# Patient Record
Sex: FEMALE | Race: WHITE | NOT HISPANIC OR LATINO | Employment: STUDENT | URBAN - METROPOLITAN AREA
[De-identification: names, ages, dates, MRNs, and addresses within clinical notes are randomized per-mention and may not be internally consistent; named-entity substitution may affect disease eponyms.]

---

## 2017-01-17 ENCOUNTER — COMMUNICATION - HEALTHEAST (OUTPATIENT)
Dept: FAMILY MEDICINE | Facility: CLINIC | Age: 32
End: 2017-01-17

## 2017-01-18 ENCOUNTER — COMMUNICATION - HEALTHEAST (OUTPATIENT)
Dept: FAMILY MEDICINE | Facility: CLINIC | Age: 32
End: 2017-01-18

## 2017-03-07 ENCOUNTER — COMMUNICATION - HEALTHEAST (OUTPATIENT)
Dept: HEALTH INFORMATION MANAGEMENT | Facility: CLINIC | Age: 32
End: 2017-03-07

## 2017-03-16 ENCOUNTER — COMMUNICATION - HEALTHEAST (OUTPATIENT)
Dept: FAMILY MEDICINE | Facility: CLINIC | Age: 32
End: 2017-03-16

## 2017-05-16 ENCOUNTER — COMMUNICATION - HEALTHEAST (OUTPATIENT)
Dept: FAMILY MEDICINE | Facility: CLINIC | Age: 32
End: 2017-05-16

## 2017-09-20 ENCOUNTER — AMBULATORY - HEALTHEAST (OUTPATIENT)
Dept: FAMILY MEDICINE | Facility: CLINIC | Age: 32
End: 2017-09-20

## 2018-06-21 ENCOUNTER — COMMUNICATION - HEALTHEAST (OUTPATIENT)
Dept: FAMILY MEDICINE | Facility: CLINIC | Age: 33
End: 2018-06-21

## 2019-07-24 ENCOUNTER — COMMUNICATION - HEALTHEAST (OUTPATIENT)
Dept: FAMILY MEDICINE | Facility: CLINIC | Age: 34
End: 2019-07-24

## 2019-07-24 DIAGNOSIS — B00.1 COLD SORE: ICD-10-CM

## 2019-08-19 ENCOUNTER — OFFICE VISIT - HEALTHEAST (OUTPATIENT)
Dept: FAMILY MEDICINE | Facility: CLINIC | Age: 34
End: 2019-08-19

## 2019-08-19 DIAGNOSIS — Z00.00 ROUTINE GENERAL MEDICAL EXAMINATION AT A HEALTH CARE FACILITY: ICD-10-CM

## 2019-08-19 LAB
CHOLEST SERPL-MCNC: 171 MG/DL
FASTING STATUS PATIENT QL REPORTED: YES
FASTING STATUS PATIENT QL REPORTED: YES
GLUCOSE BLD-MCNC: 85 MG/DL (ref 70–99)
HDLC SERPL-MCNC: 61 MG/DL
HGB BLD-MCNC: 13.8 G/DL (ref 12–16)
LDLC SERPL CALC-MCNC: 83 MG/DL
TRIGL SERPL-MCNC: 133 MG/DL
TSH SERPL DL<=0.005 MIU/L-ACNC: 1.08 UIU/ML (ref 0.3–5)

## 2019-08-19 ASSESSMENT — MIFFLIN-ST. JEOR: SCORE: 1538.74

## 2019-08-22 LAB
BKR LAB AP ABNORMAL BLEEDING: NO
BKR LAB AP BIRTH CONTROL/HORMONES: NORMAL
BKR LAB AP CERVICAL APPEARANCE: NORMAL
BKR LAB AP GYN ADEQUACY: NORMAL
BKR LAB AP GYN INTERPRETATION: NORMAL
BKR LAB AP HPV REFLEX: NORMAL
BKR LAB AP LMP: NORMAL
BKR LAB AP PATIENT STATUS: NORMAL
BKR LAB AP PREVIOUS ABNORMAL: NORMAL
BKR LAB AP PREVIOUS NORMAL: 2015
HIGH RISK?: NO
PATH REPORT.COMMENTS IMP SPEC: NORMAL
RESULT FLAG (HE HISTORICAL CONVERSION): NORMAL

## 2021-05-30 NOTE — TELEPHONE ENCOUNTER
Please help this patient make an appointment (has not been seen since 2014) and route back to me for limited refill    Thanks!    BB

## 2021-05-30 NOTE — TELEPHONE ENCOUNTER
RN cannot approve Refill Request    RN can NOT refill this medication overdue for office visits and/or labs.    Enoch Adrian, [facility] Triage/Med Refill 7/25/2019    Requested Prescriptions   Pending Prescriptions Disp Refills     valACYclovir (VALTREX) 1000 MG tablet [Pharmacy Med Name: VALACYCLOVIR HCL 1 GRAM TABLET] 8 tablet 2     Sig: TAKE 1 TABLET BY MOUTH TWICE DAILY FOR 2 DAYS       Antivirals Refill Protocol Failed - 7/24/2019  7:18 PM        Failed - Renal function done in last year     Creatinine   Date Value Ref Range Status   10/18/2015 0.68 0.60 - 1.10 mg/dL Final             Failed - Visit with PCP or prescribing provider visit in past 12 months or next 3 months     Last office visit with prescriber/PCP: Visit date not found OR same dept: Visit date not found OR same specialty: Visit date not found  Last physical: Visit date not found Last MTM visit: Visit date not found   Next visit within 3 mo: Visit date not found  Next physical within 3 mo: Visit date not found  Prescriber OR PCP: Mary Ellen Mendoza MD  Last diagnosis associated with med order: There are no diagnoses linked to this encounter.  If protocol passes may refill for 12 months if within 3 months of last provider visit (or a total of 15 months).             Passed - Patient does not have active pregnancy episode        Passed - Patient has not had positive pregnancy test in last 280 days     No results found for: HCGQUAL, PREGTESTUR, PREGSERUM, BHCG

## 2021-05-31 NOTE — PROGRESS NOTES
Assessment/Plan:     Health maintenance female exam.  All questions answered.  Await pap smear results.  Breast self exam technique reviewed and patient encouraged to perform self-exam monthly.  Discussed healthy lifestyle modifications.  Await fasting lab results  The following high BMI interventions were performed this visit: encouragement to exercise and weight monitoring    1. Routine general medical examination at a health care facility  - Gynecologic Cytology (PAP Smear)  - Lipid Cascade  - Hemoglobin  - Glucose  - Thyroid Cascade    Could consider abdominal US at age 48 for family Hx AAA      Subjective:      Sanam Hubbard is a 34 y.o. female who presents for an annual exam.  She is overall doing very well.  She has a daughter who is going into first grade and one in .  She is working out almost daily at a gym with some exercise classes and is feeling good.    Her father passed away a little bit over a year ago from a ruptured aortic aneurysm.    Healthy Habits:   Regular Exercise: Yes  Sunscreen Use: Yes  Healthy Diet: Yes  Dental Visits Regularly: Yes  Seat Belt: Yes  Sexually active: Yes  Self Breast Exam Monthly:Yes  Colonoscopy: N/A  Prevention of Osteoporosis: Yes  Last Dexa: N/A    Immunization History   Administered Date(s) Administered     Influenza, inj, historic,unspecified 2015     Tdap 2015     Immunization status: up to date and documented.    Gynecologic History  Patient's last menstrual period was 2019 (exact date).  Contraception: vasectomy  Last Pap: . Results were: normal      OB History    Para Term  AB Living   2 2 2     2   SAB TAB Ectopic Multiple Live Births           2      # Outcome Date GA Lbr Cody/2nd Weight Sex Delivery Anes PTL Lv   2 Term 10/17/15 40w1d / 00:52 8 lb (3.629 kg) F Vag-Spont  N NIRMAL   1 Term 13 40w0d  8 lb 2 oz (3.685 kg) F Vag-Vacuum EPI  NIRMAL      Birth Comments: retained placenta; failed manual removal, D&C        Current Outpatient Medications   Medication Sig Dispense Refill     valACYclovir (VALTREX) 1000 MG tablet TAKE 1 TABLET BY MOUTH TWICE DAILY FOR 2 DAYS 8 tablet 2     No current facility-administered medications for this visit.      Past Medical History:   Diagnosis Date     Gestational diabetes      Hip dysplasia      Past Surgical History:   Procedure Laterality Date     DILATION AND CURETTAGE OF UTERUS      after birth of first child and failed manual removal of placenta     RETAINED PLACENTA REMOVAL       Patient has no known allergies.  Family History   Problem Relation Age of Onset     Anxiety disorder Mother      Anxiety disorder Father      Heart disease Father      Hyperlipidemia Father      Diabetes Father      Social History     Socioeconomic History     Marital status:      Spouse name: Not on file     Number of children: Not on file     Years of education: Not on file     Highest education level: Not on file   Occupational History     Occupation: pharmacist     Employer: [employer]   Social Needs     Financial resource strain: Not on file     Food insecurity:     Worry: Not on file     Inability: Not on file     Transportation needs:     Medical: Not on file     Non-medical: Not on file   Tobacco Use     Smoking status: Never Smoker     Smokeless tobacco: Never Used   Substance and Sexual Activity     Alcohol use: No     Comment: former use prior to pregnancy     Drug use: No     Sexual activity: Not on file   Lifestyle     Physical activity:     Days per week: Not on file     Minutes per session: Not on file     Stress: Not on file   Relationships     Social connections:     Talks on phone: Not on file     Gets together: Not on file     Attends Restorationism service: Not on file     Active member of club or organization: Not on file     Attends meetings of clubs or organizations: Not on file     Relationship status: Not on file     Intimate partner violence:     Fear of current or  "ex partner: Not on file     Emotionally abused: Not on file     Physically abused: Not on file     Forced sexual activity: Not on file   Other Topics Concern     Not on file   Social History Narrative     Not on file       Review of Systems  General:  Denies problems  Eyes:  Denies problems  Ears/Nose/Throat:  Denies problems  Cardiovascular:  Denies problems  Respiratory:  Denies problems  Gastrointestinal:  Denies problems  Genitourinary:  Denies problems  Musculoskeletal:  Denies problems  Skin:  Denies problems, Neurologic:  Denies problems  Psychiatric:  Denies problems  Endocrine:  Denies problems  Heme/Lymphatic:  Denies problems  Allergic/Immunologic:  Denies problems       Objective:         Vitals:    08/19/19 0755   BP: 102/64   Pulse: 64   Resp: 12   Temp: 98.3  F (36.8  C)   TempSrc: Oral   Weight: 180 lb 6 oz (81.8 kg)   Height: 5' 7.5\" (1.715 m)       Physical Exam:  General Appearance: Alert, cooperative, no distress, appears stated age   Head: Normocephalic, without obvious abnormality, atraumatic  Eyes: PERRL, conjunctiva/corneas clear, EOM's intact   Ears: Normal TM's and external ear canals, both ears  Nose:Nares normal, septum midline,mucosa normal, no drainage    Throat:Lips, mucosa, and tongue normal; teeth and gums normal  Neck: Supple, symmetrical, trachea midline, no adenopathy;  thyroid: not enlarged, symmetric, no tenderness/mass/nodules  Back: Symmetric, no curvature, ROM normal,  Lungs: Clear to auscultation bilaterally, respirations unlabored  Breasts: No breast masses, tenderness, asymmetry, or nipple discharge.  Heart: Regular rate and rhythm, S1 and S2 normal, no murmur, rub, or gallop  Abdomen: Soft, non-tender, bowel sounds active all four quadrants,  no masses, no organomegaly  Pelvic:normal external female genitalia, normal appearing vaginal mucosa and cervix  Extremities: Extremities normal, atraumatic, no cyanosis or edema  Skin: Skin color, texture, turgor normal, no rashes " or lesions  Lymph nodes: Cervical, supraclavicular, and axillary nodes normal and   Neurologic: Normal

## 2021-06-03 VITALS — WEIGHT: 180.38 LBS | BODY MASS INDEX: 27.34 KG/M2 | HEIGHT: 68 IN

## 2021-08-21 ENCOUNTER — HEALTH MAINTENANCE LETTER (OUTPATIENT)
Age: 36
End: 2021-08-21

## 2021-10-16 ENCOUNTER — HEALTH MAINTENANCE LETTER (OUTPATIENT)
Age: 36
End: 2021-10-16

## 2021-10-19 ENCOUNTER — OFFICE VISIT (OUTPATIENT)
Dept: FAMILY MEDICINE | Facility: CLINIC | Age: 36
End: 2021-10-19
Payer: COMMERCIAL

## 2021-10-19 VITALS
DIASTOLIC BLOOD PRESSURE: 84 MMHG | TEMPERATURE: 98 F | OXYGEN SATURATION: 98 % | HEART RATE: 77 BPM | BODY MASS INDEX: 29.1 KG/M2 | WEIGHT: 192 LBS | HEIGHT: 68 IN | SYSTOLIC BLOOD PRESSURE: 126 MMHG

## 2021-10-19 DIAGNOSIS — Z86.32 HISTORY OF GESTATIONAL DIABETES: ICD-10-CM

## 2021-10-19 DIAGNOSIS — Z00.00 ROUTINE GENERAL MEDICAL EXAMINATION AT A HEALTH CARE FACILITY: Primary | ICD-10-CM

## 2021-10-19 DIAGNOSIS — Z13.220 LIPID SCREENING: ICD-10-CM

## 2021-10-19 LAB
CHOLEST SERPL-MCNC: 207 MG/DL
FASTING STATUS PATIENT QL REPORTED: YES
FASTING STATUS PATIENT QL REPORTED: YES
GLUCOSE BLD-MCNC: 94 MG/DL (ref 70–99)
HBA1C MFR BLD: 4.8 % (ref 0–5.6)
HDLC SERPL-MCNC: 58 MG/DL
LDLC SERPL CALC-MCNC: 113 MG/DL
NONHDLC SERPL-MCNC: 149 MG/DL
TRIGL SERPL-MCNC: 180 MG/DL

## 2021-10-19 PROCEDURE — 83036 HEMOGLOBIN GLYCOSYLATED A1C: CPT | Performed by: PHYSICIAN ASSISTANT

## 2021-10-19 PROCEDURE — 80061 LIPID PANEL: CPT | Performed by: PHYSICIAN ASSISTANT

## 2021-10-19 PROCEDURE — 36415 COLL VENOUS BLD VENIPUNCTURE: CPT | Performed by: PHYSICIAN ASSISTANT

## 2021-10-19 PROCEDURE — 82947 ASSAY GLUCOSE BLOOD QUANT: CPT | Performed by: PHYSICIAN ASSISTANT

## 2021-10-19 PROCEDURE — 99395 PREV VISIT EST AGE 18-39: CPT | Performed by: PHYSICIAN ASSISTANT

## 2021-10-19 ASSESSMENT — ENCOUNTER SYMPTOMS
ABDOMINAL PAIN: 0
FEVER: 0
DYSURIA: 0
COUGH: 0
HEADACHES: 0
CONSTIPATION: 0
DIARRHEA: 0
FREQUENCY: 0
HEMATURIA: 0
HEMATOCHEZIA: 0
WEAKNESS: 0
SORE THROAT: 0
PALPITATIONS: 0
HEARTBURN: 0
ARTHRALGIAS: 0
PARESTHESIAS: 0
EYE PAIN: 0
SHORTNESS OF BREATH: 0
NERVOUS/ANXIOUS: 0
MYALGIAS: 0
DIZZINESS: 0
CHILLS: 0
NAUSEA: 0
JOINT SWELLING: 0

## 2021-10-19 ASSESSMENT — MIFFLIN-ST. JEOR: SCORE: 1591.47

## 2021-10-19 ASSESSMENT — PAIN SCALES - GENERAL: PAINLEVEL: NO PAIN (0)

## 2021-10-19 NOTE — PROGRESS NOTES
SUBJECTIVE:   CC: Sanam Hubbard is an 36 year old woman who presents for preventive health visit.       Patient has been advised of split billing requirements and indicates understanding: Yes  Healthy Habits:     Getting at least 3 servings of Calcium per day:  Yes    Bi-annual eye exam:  NO    Dental care twice a year:  Yes    Sleep apnea or symptoms of sleep apnea:  None    Diet:  Regular (no restrictions)    Frequency of exercise:  4-5 days/week    Duration of exercise:  45-60 minutes    Taking medications regularly:  Yes    Medication side effects:  Not applicable    PHQ-2 Total Score: 0    Additional concerns today:  No      Today's PHQ-2 Score:   PHQ-2 ( 1999 Pfizer) 10/19/2021   Q1: Little interest or pleasure in doing things 0   Q2: Feeling down, depressed or hopeless 0   PHQ-2 Score 0   Q1: Little interest or pleasure in doing things Not at all   Q2: Feeling down, depressed or hopeless Not at all   PHQ-2 Score 0       Abuse: Current or Past (Physical, Sexual or Emotional) - No  Do you feel safe in your environment? Yes    Have you ever done Advance Care Planning? (For example, a Health Directive, POLST, or a discussion with a medical provider or your loved ones about your wishes): No, advance care planning information given to patient to review.  Patient declined advance care planning discussion at this time.    Social History     Tobacco Use     Smoking status: Never Smoker     Smokeless tobacco: Never Used   Substance Use Topics     Alcohol use: No     Comment: Alcoholic Drinks/day: former use prior to pregnancy     If you drink alcohol do you typically have >3 drinks per day or >7 drinks per week? No    Alcohol Use 10/19/2021   Prescreen: >3 drinks/day or >7 drinks/week? Yes   AUDIT SCORE  6     AUDIT - Alcohol Use Disorders Identification Test - Reproduced from the World Health Organization Audit 2001 (Second Edition) 10/19/2021   1.  How often do you have a drink containing alcohol? 2 to 3 times a  week   2.  How many drinks containing alcohol do you have on a typical day when you are drinking? 3 or 4   3.  How often do you have five or more drinks on one occasion? Monthly   4.  How often during the last year have you found that you were not able to stop drinking once you had started? Never   5.  How often during the last year have you failed to do what was normally expected of you because of drinking? Never   6.  How often during the last year have you needed a first drink in the morning to get yourself going after a heavy drinking session? Never   7.  How often during the last year have you had a feeling of guilt or remorse after drinking? Never   8.  How often during the last year have you been unable to remember what happened the night before because of your drinking? Never   9.  Have you or someone else been injured because of your drinking? No   10. Has a relative, friend, doctor or other health care worker been concerned about your drinking or suggested you cut down? No   TOTAL SCORE 6       Reviewed orders with patient.  Reviewed health maintenance and updated orders accordingly - Yes  BP Readings from Last 3 Encounters:   10/19/21 126/84   05/25/10 110/68   04/01/10 110/76    Wt Readings from Last 3 Encounters:   10/19/21 87.1 kg (192 lb)   08/19/19 81.8 kg (180 lb 6 oz)   09/09/15 91.3 kg (201 lb 3.2 oz)                  Patient Active Problem List   Diagnosis     Contraception     CARDIOVASCULAR SCREENING; LDL GOAL LESS THAN 160     Past Surgical History:   Procedure Laterality Date     DILATION AND CURETTAGE      after birth of first child and failed manual removal of placenta     HC TOOTH EXTRACTION W/FORCEP       RETAINED PLACENTA REMOVAL         Social History     Tobacco Use     Smoking status: Never Smoker     Smokeless tobacco: Never Used   Substance Use Topics     Alcohol use: No     Comment: Alcoholic Drinks/day: former use prior to pregnancy     Family History   Problem Relation Age of  Onset     Depression Mother      Anxiety Disorder Mother      C.A.D. Father      Hypertension Father      Alcohol/Drug Father      Cardiovascular Father      Depression Father      Heart Disease Father      Musculoskeletal Disorder Father      Obesity Father      Anxiety Disorder Father      Hyperlipidemia Father      Diabetes Father      Aortic aneurysm Father         at 70     Hypertension Maternal Grandmother      Alzheimer Disease Maternal Grandmother      Anesthesia Reaction Maternal Grandmother      Hypertension Maternal Grandfather      C.A.D. Paternal Grandmother      Hypertension Paternal Grandmother      Cardiovascular Paternal Grandmother      Heart Disease Paternal Grandmother      Musculoskeletal Disorder Paternal Grandmother      C.A.D. Paternal Grandfather      Hypertension Paternal Grandfather      Cancer Paternal Grandfather      Cardiovascular Paternal Grandfather      Heart Disease Paternal Grandfather      Musculoskeletal Disorder Paternal Grandfather          Current Outpatient Medications   Medication Sig Dispense Refill     VALTREX 1 GM OR TABS 2 TABLET 2 TIMES DAILY FOR ONE DAY (Patient taking differently: PRN) 4 Tab 3     No Known Allergies  Recent Labs   Lab Test 08/19/19  0827 07/30/14  0922   LDL 83 129   HDL 61 53   TRIG 133 144   TSH 1.08  --         Breast Cancer Screening:    Breast CA Risk Assessment (FHS-7) 10/19/2021   Do you have a family history of breast, colon, or ovarian cancer? No / Unknown         Patient under 40 years of age: Routine Mammogram Screening not recommended.   Pertinent mammograms are reviewed under the imaging tab.    History of abnormal Pap smear:   NO - age 30-65 PAP every 5 years with negative HPV co-testing recommended  Last 3 Pap and HPV Results:   PAP / HPV Latest Ref Rng & Units 8/19/2019 5/25/2010   PAP Negative for squamous intraepithelial lesion or malignancy. Negative for squamous intraepithelial lesion or malignancy  Electronically signed by  Nathalie Mendoza CT (ASCP) on 2019 at  1:15 PM   -   PAP (Historical) - - NIL     PAP / HPV Latest Ref Rng & Units 2019   PAP Negative for squamous intraepithelial lesion or malignancy. Negative for squamous intraepithelial lesion or malignancy  Electronically signed by Nathalie Mendoza CT (ASCP) on 2019 at  1:15 PM   -   PAP (Historical) - - NIL     Reviewed and updated as needed this visit by clinical staff   Allergies  Meds              Reviewed and updated as needed this visit by Provider                Past Medical History:   Diagnosis Date     Adult BMI 29.0-29.9 kg/sq m      NO ACTIVE PROBLEMS       Past Surgical History:   Procedure Laterality Date     DILATION AND CURETTAGE      after birth of first child and failed manual removal of placenta     HC TOOTH EXTRACTION W/FORCEP       RETAINED PLACENTA REMOVAL       OB History    Para Term  AB Living   2 2 2 0 0 2   SAB TAB Ectopic Multiple Live Births   0 0 0 0 2      # Outcome Date GA Lbr Cody/2nd Weight Sex Delivery Anes PTL Lv   2 Term 10/17/15 40w1d / 00:52 3.629 kg (8 lb) F Vag-Spont  N NIRMAL      Name: JOSE,FEMALE-MARCIANO      Apgar1: 9  Apgar5: 9   1 Term 13 40w0d  3.685 kg (8 lb 2 oz) F Vag-Vacuum EPI  NIRMAL      Birth Comments: retained placenta; failed manual removal, D&C       Review of Systems   Constitutional: Negative for chills and fever.   HENT: Negative for congestion, ear pain, hearing loss and sore throat.    Eyes: Negative for pain and visual disturbance.   Respiratory: Negative for cough and shortness of breath.    Cardiovascular: Negative for chest pain, palpitations and peripheral edema.   Gastrointestinal: Negative for abdominal pain, constipation, diarrhea, heartburn, hematochezia and nausea.   Genitourinary: Negative for dysuria, frequency, genital sores, hematuria and urgency.   Musculoskeletal: Negative for arthralgias, joint swelling and myalgias.   Skin: Negative for rash.  "  Neurological: Negative for dizziness, weakness, headaches and paresthesias.   Psychiatric/Behavioral: Negative for mood changes. The patient is not nervous/anxious.           OBJECTIVE:   /84   Pulse 77   Temp 98  F (36.7  C) (Tympanic)   Ht 1.715 m (5' 7.5\")   Wt 87.1 kg (192 lb)   LMP 09/28/2021   SpO2 98%   BMI 29.63 kg/m    Physical Exam  GENERAL: healthy, alert and no distress  EYES: Eyes grossly normal to inspection, PERRL and conjunctivae and sclerae normal  HENT: ear canals and TM's normal, nose and mouth without ulcers or lesions  NECK: no adenopathy, no asymmetry, masses, or scars and thyroid normal to palpation  RESP: lungs clear to auscultation - no rales, rhonchi or wheezes  BREAST: normal without masses, tenderness or nipple discharge and no palpable axillary masses or adenopathy  CV: regular rate and rhythm, normal S1 S2, no S3 or S4, no murmur, click or rub, no peripheral edema and peripheral pulses strong  ABDOMEN: soft, nontender, no hepatosplenomegaly, no masses and bowel sounds normal  MS: no gross musculoskeletal defects noted, no edema  SKIN: no suspicious lesions or rashes  NEURO: Normal strength and tone, mentation intact and speech normal  PSYCH: mentation appears normal, affect normal/bright    Diagnostic Test Results:  Labs reviewed in Epic    ASSESSMENT/PLAN:   (Z00.00) Routine general medical examination at a health care facility  (primary encounter diagnosis)  Health maintenance reviewed and updated.  Vaccinations for COVID booster and flu vaccine given by employer    (Z86.32) History of gestational diabetes  Plan: Glucose, Hemoglobin A1c           (Z13.220) Lipid screening  Plan: Lipid panel reflex to direct LDL Fasting              Patient has been advised of split billing requirements and indicates understanding: Yes  COUNSELING:  Reviewed preventive health counseling, as reflected in patient instructions       Regular exercise       Healthy diet/nutrition    Estimated " "body mass index is 29.63 kg/m  as calculated from the following:    Height as of this encounter: 1.715 m (5' 7.5\").    Weight as of this encounter: 87.1 kg (192 lb).    Weight management plan: Discussed healthy diet and exercise guidelines given in AVS    She reports that she has never smoked. She has never used smokeless tobacco.      Counseling Resources:  ATP IV Guidelines  Pooled Cohorts Equation Calculator  Breast Cancer Risk Calculator  BRCA-Related Cancer Risk Assessment: FHS-7 Tool  FRAX Risk Assessment  ICSI Preventive Guidelines  Dietary Guidelines for Americans, 2010  USDA's MyPlate  ASA Prophylaxis  Lung CA Screening    Kristen M. Kehr, PA-C  [facility]  "

## 2021-10-19 NOTE — NURSING NOTE
"Chief Complaint   Patient presents with     Physical       Initial /84   Pulse 77   Temp 98  F (36.7  C) (Tympanic)   Ht 1.715 m (5' 7.5\")   Wt 87.1 kg (192 lb)   LMP 09/28/2021   SpO2 98%   BMI 29.63 kg/m   Estimated body mass index is 29.63 kg/m  as calculated from the following:    Height as of this encounter: 1.715 m (5' 7.5\").    Weight as of this encounter: 87.1 kg (192 lb).  Medication Reconciliation: complete    TEE Vallejo MA    "

## 2022-09-25 ENCOUNTER — HEALTH MAINTENANCE LETTER (OUTPATIENT)
Age: 37
End: 2022-09-25

## 2023-02-04 ENCOUNTER — HEALTH MAINTENANCE LETTER (OUTPATIENT)
Age: 38
End: 2023-02-04

## 2024-05-11 ENCOUNTER — HEALTH MAINTENANCE LETTER (OUTPATIENT)
Age: 39
End: 2024-05-11